# Patient Record
Sex: MALE | Race: OTHER
[De-identification: names, ages, dates, MRNs, and addresses within clinical notes are randomized per-mention and may not be internally consistent; named-entity substitution may affect disease eponyms.]

---

## 2018-04-18 ENCOUNTER — HOSPITAL ENCOUNTER (OUTPATIENT)
Dept: HOSPITAL 17 - HSDC | Age: 48
Discharge: HOME | End: 2018-04-18
Payer: COMMERCIAL

## 2018-04-18 VITALS
OXYGEN SATURATION: 99 % | HEART RATE: 67 BPM | TEMPERATURE: 97.9 F | DIASTOLIC BLOOD PRESSURE: 83 MMHG | RESPIRATION RATE: 20 BRPM | SYSTOLIC BLOOD PRESSURE: 111 MMHG

## 2018-04-18 VITALS — BODY MASS INDEX: 36.28 KG/M2 | HEIGHT: 66 IN | WEIGHT: 225.75 LBS

## 2018-04-18 DIAGNOSIS — N20.0: Primary | ICD-10-CM

## 2018-04-18 LAB
BASOPHILS # BLD AUTO: 0.1 TH/MM3 (ref 0–0.2)
BASOPHILS NFR BLD: 0.8 % (ref 0–2)
EOSINOPHIL # BLD: 0.2 TH/MM3 (ref 0–0.4)
EOSINOPHIL NFR BLD: 2.6 % (ref 0–4)
ERYTHROCYTE [DISTWIDTH] IN BLOOD BY AUTOMATED COUNT: 13.8 % (ref 11.6–17.2)
HCT VFR BLD CALC: 42.7 % (ref 39–51)
HGB BLD-MCNC: 14.7 GM/DL (ref 13–17)
LYMPHOCYTES # BLD AUTO: 2.1 TH/MM3 (ref 1–4.8)
LYMPHOCYTES NFR BLD AUTO: 31.4 % (ref 9–44)
MCH RBC QN AUTO: 29.6 PG (ref 27–34)
MCHC RBC AUTO-ENTMCNC: 34.4 % (ref 32–36)
MCV RBC AUTO: 86 FL (ref 80–100)
MONOCYTE #: 0.7 TH/MM3 (ref 0–0.9)
MONOCYTES NFR BLD: 9.6 % (ref 0–8)
NEUTROPHILS # BLD AUTO: 3.8 TH/MM3 (ref 1.8–7.7)
NEUTROPHILS NFR BLD AUTO: 55.6 % (ref 16–70)
PLATELET # BLD: 255 TH/MM3 (ref 150–450)
PMV BLD AUTO: 8.3 FL (ref 7–11)
RBC # BLD AUTO: 4.96 MIL/MM3 (ref 4.5–5.9)
WBC # BLD AUTO: 6.8 TH/MM3 (ref 4–11)

## 2018-04-18 PROCEDURE — 85025 COMPLETE CBC W/AUTO DIFF WBC: CPT

## 2018-04-18 PROCEDURE — 74018 RADEX ABDOMEN 1 VIEW: CPT

## 2018-04-18 PROCEDURE — 00873 ANES LITHOTRP ESW WO WTR BTH: CPT

## 2018-04-18 PROCEDURE — 50590 FRAGMENTING OF KIDNEY STONE: CPT

## 2018-04-18 NOTE — PD.OP
__________________________________________________





Operative Report


Date of Surgery:  Apr 18, 2018


Preoperative Diagnosis:  


Right renal calculi


Postoperative Diagnosis:  


Same


Procedure:


Right extracorporeal shockwave lithotripsy


Anesthesia:


General LMA


Surgeon:


Tyree Obrien


Assistant(s):


None


Resident Surgeon:


None


Operation and Findings:


47-year-old male presented with findings of bilateral renal calculi.  Patient 

had a 9 mm stone located in the mid right pole and a 6-7 mm stone in the upper 

pole.  Patient also with stones on the left side.  He elected to undergo right 

extracorporeal shockwave lithotripsy.  Risk and benefits were discussed 

preoperatively and he was willing to proceed.  The patient was brought to the 

operating room and identified by myself as Jelani Thomas.  He was placed on 

the operating room table in the supine position.  Under fluoroscopic imaging 

guidance the stone was visualized.  The patient received preprocedure 

antibiotics as well as general LMA anesthesia.  Right extracorporeal shockwave 

lithotripsy commenced with the patient receiving a total of 2000 shocks to the 

right midpole stone.  Good fragmentation of this large stone was noted under 

fluoroscopic imaging guidance.  He tolerated the procedure well.  He will 

require repeat surgery right extracorporeal shockwave lithotripsy in the future 

as well as treatment of his left renal stones.  Patient will follow-up in the 

office in a few weeks and obtain a KUB x-ray prior.  Determination at that time 

will be made as to which side to proceed with treatment.











Tyree Obrien DO Apr 18, 2018 10:49

## 2018-04-18 NOTE — RADRPT
EXAM DATE/TIME:  04/18/2018 08:07 

 

[facility] COMPARISON:     

No previous studies available for comparison.

 

                     

INDICATIONS :     

Right ESWL today. 

                     

 

MEDICAL HISTORY :     

None.          

 

SURGICAL HISTORY :     

None.   

 

ENCOUNTER:     

Initial                                        

 

ACUITY:     

1 day      

 

PAIN SCORE:     

0/10

 

LOCATION:     

Right  abdomen. 

 

FINDINGS:     

There are 2 calcified densities projecting in the mid to lower pole of the right kidney measuring 5 m
m and 9 mm. There is also a 7 mm calcified density projecting over the mid left kidney. Several calci
fications in the pelvis are likely phleboliths. No dilated loops of bowel are noted. No gross free ai
r or pneumatosis. Osseous structures are intact.

 

CONCLUSION:     

1. Calcified renal calculi measuring 5 mm and 9 mm in the mid to lower pole of the right kidney.

2. 7 mm calcified calculus in the mid left kidney.

 

 

 

 Rogers Lind MD on April 18, 2018 at 8:31           

Board Certified Radiologist.

 This report was verified electronically.

## 2020-08-21 ENCOUNTER — APPOINTMENT (RX ONLY)
Dept: URBAN - METROPOLITAN AREA CLINIC 53 | Facility: CLINIC | Age: 50
Setting detail: DERMATOLOGY
End: 2020-08-21

## 2020-08-21 DIAGNOSIS — L30.8 OTHER SPECIFIED DERMATITIS: ICD-10-CM | Status: INADEQUATELY CONTROLLED

## 2020-08-21 PROCEDURE — ? ADDITIONAL NOTES

## 2020-08-21 PROCEDURE — ? PRESCRIPTION

## 2020-08-21 PROCEDURE — 99202 OFFICE O/P NEW SF 15 MIN: CPT

## 2020-08-21 PROCEDURE — ? COUNSELING

## 2020-08-21 PROCEDURE — ? FULL BODY SKIN EXAM - DECLINED

## 2020-08-21 RX ORDER — CLOBETASOL PROPIONATE 0.5 MG/G
CREAM TOPICAL
Qty: 1 | Refills: 3 | Status: ERX | COMMUNITY
Start: 2020-08-21

## 2020-08-21 RX ADMIN — CLOBETASOL PROPIONATE: 0.5 CREAM TOPICAL at 00:00

## 2020-08-21 ASSESSMENT — SEVERITY ASSESSMENT: SEVERITY: MILD

## 2020-08-21 ASSESSMENT — LOCATION SIMPLE DESCRIPTION DERM
LOCATION SIMPLE: LEFT HAND
LOCATION SIMPLE: RIGHT HAND

## 2020-08-21 ASSESSMENT — PAIN INTENSITY VAS: HOW INTENSE IS YOUR PAIN 0 BEING NO PAIN, 10 BEING THE MOST SEVERE PAIN POSSIBLE?: NO PAIN

## 2020-08-21 ASSESSMENT — LOCATION DETAILED DESCRIPTION DERM
LOCATION DETAILED: RIGHT ULNAR PALM
LOCATION DETAILED: LEFT RADIAL PALM

## 2020-08-21 ASSESSMENT — LOCATION ZONE DERM: LOCATION ZONE: HAND
